# Patient Record
Sex: MALE | Race: WHITE | NOT HISPANIC OR LATINO | Employment: OTHER | URBAN - METROPOLITAN AREA
[De-identification: names, ages, dates, MRNs, and addresses within clinical notes are randomized per-mention and may not be internally consistent; named-entity substitution may affect disease eponyms.]

---

## 2023-09-13 ENCOUNTER — HOSPITAL ENCOUNTER (OUTPATIENT)
Dept: DATA CONVERSION | Facility: HOSPITAL | Age: 83
Discharge: HOME | End: 2023-09-13
Payer: MEDICARE

## 2023-09-13 DIAGNOSIS — R73.01 IMPAIRED FASTING GLUCOSE: ICD-10-CM

## 2023-09-13 LAB
ANION GAP SERPL CALCULATED.3IONS-SCNC: 10 MMOL/L (ref 0–19)
BUN SERPL-MCNC: 27 MG/DL (ref 8–25)
BUN/CREAT SERPL: 24.5 RATIO (ref 8–21)
CALCIUM SERPL-MCNC: 9.3 MG/DL (ref 8.5–10.4)
CHLORIDE SERPL-SCNC: 106 MMOL/L (ref 97–107)
CO2 SERPL-SCNC: 26 MMOL/L (ref 24–31)
CREAT SERPL-MCNC: 1.1 MG/DL (ref 0.4–1.6)
GFR SERPL CREATININE-BSD FRML MDRD: 66 ML/MIN/1.73 M2
GLUCOSE SERPL-MCNC: 102 MG/DL (ref 65–99)
POTASSIUM SERPL-SCNC: 4.6 MMOL/L (ref 3.4–5.1)
SODIUM SERPL-SCNC: 141 MMOL/L (ref 133–145)

## 2023-12-28 ENCOUNTER — TELEMEDICINE (OUTPATIENT)
Dept: PRIMARY CARE | Facility: CLINIC | Age: 83
End: 2023-12-28
Payer: MEDICARE

## 2023-12-28 VITALS — HEIGHT: 69 IN | WEIGHT: 180 LBS | BODY MASS INDEX: 26.66 KG/M2

## 2023-12-28 DIAGNOSIS — U07.1 COVID-19 VIRUS INFECTION: Primary | ICD-10-CM

## 2023-12-28 PROCEDURE — 99213 OFFICE O/P EST LOW 20 MIN: CPT | Performed by: STUDENT IN AN ORGANIZED HEALTH CARE EDUCATION/TRAINING PROGRAM

## 2023-12-28 RX ORDER — ATORVASTATIN CALCIUM 10 MG/1
10 TABLET, FILM COATED ORAL EVERY 24 HOURS
COMMUNITY

## 2023-12-28 RX ORDER — DEXTROMETHORPHAN HYDROBROMIDE, GUAIFENESIN 5; 100 MG/5ML; MG/5ML
650 LIQUID ORAL EVERY 8 HOURS PRN
COMMUNITY

## 2023-12-28 RX ORDER — NAPROXEN SODIUM 220 MG
220 TABLET ORAL EVERY 24 HOURS
COMMUNITY

## 2023-12-28 RX ORDER — TAMSULOSIN HYDROCHLORIDE 0.4 MG/1
0.4 CAPSULE ORAL DAILY
COMMUNITY

## 2023-12-28 RX ORDER — FINASTERIDE 5 MG/1
5 TABLET, FILM COATED ORAL DAILY
COMMUNITY

## 2023-12-28 ASSESSMENT — PATIENT HEALTH QUESTIONNAIRE - PHQ9
SUM OF ALL RESPONSES TO PHQ9 QUESTIONS 1 AND 2: 0
2. FEELING DOWN, DEPRESSED OR HOPELESS: NOT AT ALL
1. LITTLE INTEREST OR PLEASURE IN DOING THINGS: NOT AT ALL

## 2023-12-28 ASSESSMENT — PAIN SCALES - GENERAL: PAINLEVEL: 0-NO PAIN

## 2023-12-28 NOTE — PROGRESS NOTES
"Subjective   Patient ID: Jaden Richards is a 83 y.o. male who presents virtually for [phone] and COVID Positive.    HPI  82 yo male presents virtually for covid infection  Covid infection  Symptoms started yesterday, tested positive today  Stomach ache, headache  Rhinorrhea    Review of Systems  All pertinent positive symptoms are included in the history of present illness.    All other systems have been reviewed and are negative and noncontributory to this patient's current ailments.     No Known Allergies     Immunization History   Administered Date(s) Administered    Moderna SARS-CoV-2 Vaccination 10/24/2021, 06/11/2022       Objective   Vitals:    12/28/23 1044   Weight: 81.6 kg (180 lb)   Height: 1.753 m (5' 9\")       Physical Exam Virtual visit  CONSTITUTIONAL -in no acute distress  CHEST - non labored breathing  PSYCHIATRIC - alert, pleasant and cordial, age-appropriate    Assessment/Plan   82 yo male presents virtually for covid infection  Covid infection  Start molnupiravir, advil/tylenol prn, flonase    RTC as needed  "

## 2024-01-19 ENCOUNTER — TELEPHONE (OUTPATIENT)
Dept: PRIMARY CARE | Facility: CLINIC | Age: 84
End: 2024-01-19
Payer: MEDICARE

## 2024-01-19 NOTE — TELEPHONE ENCOUNTER
Spoke with the patient, he gave verbal consent for the office to have the records that they are asking for. He is currently in [place] until summer.

## 2024-01-19 NOTE — TELEPHONE ENCOUNTER
Received suspicious paper work from [facility] physician [facility] in [place] asking for Pt recent office note.No release from the Pt.called Pt to verify.left voice message.

## 2024-01-19 NOTE — TELEPHONE ENCOUNTER
Patient left message stating he was seeing a physician in [place] and he gave Dr Penny name to send a form to.

## 2024-01-23 NOTE — TELEPHONE ENCOUNTER
Spoke with the Pt I informed him that we would need a written medical release for records not a verbal.Pt verbally understands.Pt states he printed his records from my chart and gave it to the doctors in [place].

## 2024-07-19 DIAGNOSIS — M19.90 ARTHRITIS: Primary | ICD-10-CM

## 2024-07-19 RX ORDER — MELOXICAM 15 MG/1
15 TABLET ORAL DAILY
COMMUNITY
Start: 2024-04-27 | End: 2024-07-19 | Stop reason: SDUPTHER

## 2024-07-19 RX ORDER — MELOXICAM 15 MG/1
15 TABLET ORAL DAILY
Qty: 30 TABLET | Refills: 0 | Status: SHIPPED | OUTPATIENT
Start: 2024-07-19

## 2024-08-20 DIAGNOSIS — M19.90 ARTHRITIS: ICD-10-CM

## 2024-08-21 DIAGNOSIS — M19.90 ARTHRITIS: ICD-10-CM

## 2024-08-21 RX ORDER — MELOXICAM 15 MG/1
15 TABLET ORAL DAILY
Qty: 90 TABLET | Refills: 3 | Status: SHIPPED | OUTPATIENT
Start: 2024-08-21 | End: 2025-08-21

## 2024-08-21 RX ORDER — MELOXICAM 15 MG/1
15 TABLET ORAL DAILY
Qty: 30 TABLET | Refills: 3 | Status: SHIPPED | OUTPATIENT
Start: 2024-08-21 | End: 2024-08-21 | Stop reason: SDUPTHER

## 2024-10-10 ENCOUNTER — LAB (OUTPATIENT)
Dept: LAB | Facility: LAB | Age: 84
End: 2024-10-10
Payer: MEDICARE

## 2024-10-10 ENCOUNTER — OFFICE VISIT (OUTPATIENT)
Dept: PRIMARY CARE | Facility: CLINIC | Age: 84
End: 2024-10-10
Payer: MEDICARE

## 2024-10-10 VITALS
WEIGHT: 187 LBS | DIASTOLIC BLOOD PRESSURE: 70 MMHG | HEIGHT: 69 IN | BODY MASS INDEX: 27.7 KG/M2 | OXYGEN SATURATION: 98 % | HEART RATE: 77 BPM | SYSTOLIC BLOOD PRESSURE: 132 MMHG

## 2024-10-10 DIAGNOSIS — N40.0 BENIGN PROSTATIC HYPERPLASIA, UNSPECIFIED WHETHER LOWER URINARY TRACT SYMPTOMS PRESENT: ICD-10-CM

## 2024-10-10 DIAGNOSIS — Z23 NEED FOR VACCINATION: ICD-10-CM

## 2024-10-10 DIAGNOSIS — Z12.5 SCREENING PSA (PROSTATE SPECIFIC ANTIGEN): ICD-10-CM

## 2024-10-10 DIAGNOSIS — Z00.00 ROUTINE GENERAL MEDICAL EXAMINATION AT HEALTH CARE FACILITY: Primary | ICD-10-CM

## 2024-10-10 PROBLEM — C44.311 BASAL CELL CARCINOMA OF SKIN OF NOSE: Status: ACTIVE | Noted: 2019-06-04

## 2024-10-10 PROBLEM — M17.12 ARTHRITIS OF LEFT KNEE: Status: ACTIVE | Noted: 2024-02-14

## 2024-10-10 LAB
ANION GAP SERPL CALCULATED.3IONS-SCNC: 10 MMOL/L (ref 10–20)
BUN SERPL-MCNC: 27 MG/DL (ref 6–23)
CALCIUM SERPL-MCNC: 9.3 MG/DL (ref 8.6–10.3)
CHLORIDE SERPL-SCNC: 107 MMOL/L (ref 98–107)
CO2 SERPL-SCNC: 29 MMOL/L (ref 21–32)
CREAT SERPL-MCNC: 1.12 MG/DL (ref 0.5–1.3)
EGFRCR SERPLBLD CKD-EPI 2021: 64 ML/MIN/1.73M*2
GLUCOSE SERPL-MCNC: 108 MG/DL (ref 74–99)
POC APPEARANCE, URINE: CLEAR
POC BILIRUBIN, URINE: NEGATIVE
POC BLOOD, URINE: NEGATIVE
POC COLOR, URINE: YELLOW
POC GLUCOSE, URINE: NEGATIVE MG/DL
POC KETONES, URINE: NEGATIVE MG/DL
POC LEUKOCYTES, URINE: NEGATIVE
POC NITRITE,URINE: NEGATIVE
POC PH, URINE: 6 PH
POC PROTEIN, URINE: NEGATIVE MG/DL
POC SPECIFIC GRAVITY, URINE: 1.02
POC UROBILINOGEN, URINE: 0.2 EU/DL
POTASSIUM SERPL-SCNC: 4.7 MMOL/L (ref 3.5–5.3)
PSA SERPL-MCNC: 7.8 NG/ML
SODIUM SERPL-SCNC: 141 MMOL/L (ref 136–145)

## 2024-10-10 PROCEDURE — 1036F TOBACCO NON-USER: CPT | Performed by: FAMILY MEDICINE

## 2024-10-10 PROCEDURE — G0103 PSA SCREENING: HCPCS

## 2024-10-10 PROCEDURE — 90662 IIV NO PRSV INCREASED AG IM: CPT | Performed by: FAMILY MEDICINE

## 2024-10-10 PROCEDURE — G0439 PPPS, SUBSEQ VISIT: HCPCS | Performed by: FAMILY MEDICINE

## 2024-10-10 PROCEDURE — 80048 BASIC METABOLIC PNL TOTAL CA: CPT

## 2024-10-10 PROCEDURE — 1158F ADVNC CARE PLAN TLK DOCD: CPT | Performed by: FAMILY MEDICINE

## 2024-10-10 PROCEDURE — 36415 COLL VENOUS BLD VENIPUNCTURE: CPT

## 2024-10-10 PROCEDURE — 1123F ACP DISCUSS/DSCN MKR DOCD: CPT | Performed by: FAMILY MEDICINE

## 2024-10-10 PROCEDURE — 1126F AMNT PAIN NOTED NONE PRSNT: CPT | Performed by: FAMILY MEDICINE

## 2024-10-10 PROCEDURE — 1159F MED LIST DOCD IN RCRD: CPT | Performed by: FAMILY MEDICINE

## 2024-10-10 PROCEDURE — 81003 URINALYSIS AUTO W/O SCOPE: CPT | Mod: QW | Performed by: FAMILY MEDICINE

## 2024-10-10 PROCEDURE — 99215 OFFICE O/P EST HI 40 MIN: CPT | Performed by: FAMILY MEDICINE

## 2024-10-10 ASSESSMENT — PATIENT HEALTH QUESTIONNAIRE - PHQ9
2. FEELING DOWN, DEPRESSED OR HOPELESS: NOT AT ALL
SUM OF ALL RESPONSES TO PHQ9 QUESTIONS 1 AND 2: 0
1. LITTLE INTEREST OR PLEASURE IN DOING THINGS: NOT AT ALL

## 2024-10-10 ASSESSMENT — ENCOUNTER SYMPTOMS
HEADACHES: 0
ARTHRALGIAS: 1
ACTIVITY CHANGE: 0
PALPITATIONS: 0
DIFFICULTY URINATING: 0
JOINT SWELLING: 1
DIZZINESS: 0
BACK PAIN: 0

## 2024-10-10 ASSESSMENT — VISUAL ACUITY
OD_CC: 20/30
OS_CC: 20/30

## 2024-10-10 ASSESSMENT — COGNITIVE AND FUNCTIONAL STATUS - GENERAL: VERBAL FLUENCY - ANIMAL NAMES (0 TO 25): 3

## 2024-10-10 ASSESSMENT — PAIN SCALES - GENERAL: PAINLEVEL: 0-NO PAIN

## 2024-10-10 NOTE — PATIENT INSTRUCTIONS
Recommend metamucil for constipation or looser stools.  Can see GI if needed,   Covid and RSV vaccines recommended.   Follow up urology and ophthalmology.

## 2024-10-10 NOTE — PROGRESS NOTES
[facility]: [facility]  MEDICARE WELLNESS EXAM      Jaden Richards is a 83 y.o. male that is presenting today for Annual Exam.    Concerns:    Subjective   Pt states when he was in [place] he had knee swelling had injection by orthopedics Jan 29. Knee is still doing well.  Takes meloxicam for oa     Dyslipidemia- he stopped taking atorvostatin.      Golf for exercise.      Had reaction to azithromycin this past year.       Review of Systems   Constitutional:  Negative for activity change.   Cardiovascular:  Negative for chest pain, palpitations and leg swelling.   Genitourinary:  Negative for difficulty urinating.   Musculoskeletal:  Positive for arthralgias and joint swelling. Negative for back pain and gait problem.   Neurological:  Negative for dizziness and headaches.       Other Providers: [facility].  [facility]. Dermatology.     Hearing Changes: no    Cognitive Assessment: mini-cog    Depression Screening: negative     ACTIVITIES OF DAILY LIVING:  Basic ADLs:  Problems with Bathing, Dressing, Toileting, Transferring, Continence, Feeding No  Instrumental ADLs:  No problems with Ability to use phone, Shopping, Cooking, House-keeping, Laundry, Transportation, Medication Management, Finance Management No    Advanced Care Planning was discussed with patient:  The patient does not have an advanced care plan on file. The patient does not have an active surrogate decision-maker on file.    History    Past Medical History:   Diagnosis Date    Allergic 3/3/2023    Arthritis 2/14/2024    Other specified health status     Known health problems: none     Past Surgical History:   Procedure Laterality Date    CHOLECYSTECTOMY  12/15/1991    CIRCUMCISION, PRIMARY  1938    EYE SURGERY  8/14/2014    HERNIA REPAIR  2/15/1975    OTHER SURGICAL HISTORY  12/06/2022    No history of surgery    TONSILLECTOMY  2/15/1958    VASECTOMY  [id number]     Family History   Problem Relation Name  Age of Onset    Arthritis Father Kevin     Accidental death Brother Santiago     Cancer Brother Ben     Cancer Sister Daniela      Allergies   Allergen Reactions    Amoxicillin Nausea Only    Azithromycin Other     Current Outpatient Medications on File Prior to Visit   Medication Sig Dispense Refill    acetaminophen (Tylenol 8 HOUR) 650 mg ER tablet Take 1 tablet (650 mg) by mouth every 8 hours if needed for mild pain (1 - 3). Do not crush, chew, or split.      finasteride (Proscar) 5 mg tablet Take 1 tablet (5 mg) by mouth once daily.      meloxicam (Mobic) 15 mg tablet Take 1 tablet (15 mg) by mouth once daily. 90 tablet 3    molnupiravir 200 mg capsule Take 4 capsules (800 mg) by mouth 2 times a day. 40 capsule 0    naproxen sodium (Aleve) 220 mg tablet Take 1 tablet (220 mg) by mouth once every 24 hours.      tamsulosin (Flomax) 0.4 mg 24 hr capsule Take 1 capsule (0.4 mg) by mouth once daily.      atorvastatin (Lipitor) 10 mg tablet Take 1 tablet (10 mg) by mouth once every 24 hours.       No current facility-administered medications on file prior to visit.     Immunization History   Administered Date(s) Administered    Moderna COVID-19 vaccine, 12 years and older (50mcg/0.5mL)(Spikevax) 10/04/2023, 10/02/2024    Moderna SARS-CoV-2 Vaccination 01/30/2021, 02/27/2021, 10/24/2021, 06/11/2022     Patient's medical history was reviewed and updated either before or during this encounter.    Objective   Vitals:    10/10/24 0754   BP: 132/70   Pulse: 77   SpO2: 98%      Physical Exam  Constitutional:       General: He is not in acute distress.     Appearance: Normal appearance. He is not ill-appearing.   HENT:      Right Ear: Tympanic membrane, ear canal and external ear normal. There is no impacted cerumen.      Left Ear: Tympanic membrane, ear canal and external ear normal.      Mouth/Throat:      Pharynx: Oropharynx is clear. No posterior oropharyngeal erythema.   Neck:      Thyroid: No thyroid mass or thyroid  tenderness.      Vascular: No carotid bruit.   Cardiovascular:      Rate and Rhythm: Normal rate and regular rhythm.      Pulses:           Radial pulses are 2+ on the right side and 2+ on the left side.        Dorsalis pedis pulses are 2+ on the right side and 2+ on the left side.      Heart sounds: Normal heart sounds. No murmur heard.     No friction rub. No gallop.   Pulmonary:      Effort: Pulmonary effort is normal.      Breath sounds: Normal breath sounds.   Abdominal:      General: Bowel sounds are normal.      Palpations: Abdomen is soft. There is no hepatomegaly or splenomegaly.      Tenderness: There is no abdominal tenderness.   Musculoskeletal:      Cervical back: Normal range of motion. No tenderness.      Right lower leg: No edema.      Left lower leg: No edema.      Comments: No gross abnormalities    Lymphadenopathy:      Cervical: No cervical adenopathy.      Upper Body:      Right upper body: No supraclavicular adenopathy.      Left upper body: No supraclavicular adenopathy.   Skin:     General: Skin is warm.      Capillary Refill: Capillary refill takes 2 to 3 seconds.   Neurological:      General: No focal deficit present.      Mental Status: He is alert.      Cranial Nerves: Cranial nerves 2-12 are intact.      Coordination: Coordination is intact.      Gait: Gait is intact.      Comments: No obvious neurological deficits    Psychiatric:         Mood and Affect: Mood and affect normal.       Mobility Assessment: get up and go test <30 seconds- Yes.       Assessment/Plan    There are no diagnoses linked to this encounter.  There is no problem list on file for this patient.    Advance Care Planning   Diagnoses and all orders for this visit:  Routine general medical examination at health care facility  -     POCT UA Automated manually resulted  -     1 Year Follow Up In Primary Care - Wellness Exam; Future  Need for vaccination  Other orders  -     Flu vaccine, trivalent, preservative free,  HIGH-DOSE, age 65y+ (Fluzone)  Recommend metamucil for constipation or looser stools.  Can see GI if needed,   Covid and RSV vaccines recommended.   The patient was encouraged to ensure that any/all documentation is accurate and up to date, and that our office be provided a copy in the event that anything changes.    Santiago Knowles MD

## 2025-07-10 ENCOUNTER — OFFICE VISIT (OUTPATIENT)
Dept: PRIMARY CARE | Facility: CLINIC | Age: 85
End: 2025-07-10
Payer: MEDICARE

## 2025-07-10 VITALS
HEIGHT: 69 IN | WEIGHT: 186 LBS | OXYGEN SATURATION: 95 % | HEART RATE: 85 BPM | DIASTOLIC BLOOD PRESSURE: 78 MMHG | SYSTOLIC BLOOD PRESSURE: 132 MMHG | BODY MASS INDEX: 27.55 KG/M2

## 2025-07-10 DIAGNOSIS — M75.81 TENDINITIS OF RIGHT ROTATOR CUFF: Primary | ICD-10-CM

## 2025-07-10 PROCEDURE — 1159F MED LIST DOCD IN RCRD: CPT | Performed by: FAMILY MEDICINE

## 2025-07-10 PROCEDURE — 1123F ACP DISCUSS/DSCN MKR DOCD: CPT | Performed by: FAMILY MEDICINE

## 2025-07-10 PROCEDURE — 1036F TOBACCO NON-USER: CPT | Performed by: FAMILY MEDICINE

## 2025-07-10 PROCEDURE — 99213 OFFICE O/P EST LOW 20 MIN: CPT | Performed by: FAMILY MEDICINE

## 2025-07-10 PROCEDURE — 1125F AMNT PAIN NOTED PAIN PRSNT: CPT | Performed by: FAMILY MEDICINE

## 2025-07-10 PROCEDURE — G2211 COMPLEX E/M VISIT ADD ON: HCPCS | Performed by: FAMILY MEDICINE

## 2025-07-10 RX ORDER — CELECOXIB 200 MG/1
200 CAPSULE ORAL 2 TIMES DAILY
Qty: 60 CAPSULE | Refills: 5 | Status: SHIPPED | OUTPATIENT
Start: 2025-07-10 | End: 2026-01-06

## 2025-07-10 RX ORDER — TIZANIDINE HYDROCHLORIDE 4 MG/1
4 CAPSULE, GELATIN COATED ORAL NIGHTLY
Qty: 30 CAPSULE | Refills: 0 | Status: SHIPPED | OUTPATIENT
Start: 2025-07-10

## 2025-07-10 ASSESSMENT — COLUMBIA-SUICIDE SEVERITY RATING SCALE - C-SSRS
6. HAVE YOU EVER DONE ANYTHING, STARTED TO DO ANYTHING, OR PREPARED TO DO ANYTHING TO END YOUR LIFE?: NO
2. HAVE YOU ACTUALLY HAD ANY THOUGHTS OF KILLING YOURSELF?: NO
1. IN THE PAST MONTH, HAVE YOU WISHED YOU WERE DEAD OR WISHED YOU COULD GO TO SLEEP AND NOT WAKE UP?: NO

## 2025-07-10 ASSESSMENT — PATIENT HEALTH QUESTIONNAIRE - PHQ9
SUM OF ALL RESPONSES TO PHQ9 QUESTIONS 1 AND 2: 0
1. LITTLE INTEREST OR PLEASURE IN DOING THINGS: NOT AT ALL
2. FEELING DOWN, DEPRESSED OR HOPELESS: NOT AT ALL

## 2025-07-10 ASSESSMENT — ENCOUNTER SYMPTOMS
LOSS OF SENSATION IN FEET: 0
DEPRESSION: 0
OCCASIONAL FEELINGS OF UNSTEADINESS: 0

## 2025-07-10 ASSESSMENT — PAIN SCALES - GENERAL: PAINLEVEL_OUTOF10: 7

## 2025-07-10 NOTE — PROGRESS NOTES
84-year-old presents to establish care and for new complaints      Right shoulder pain:  Has had complete workup and attempted trial treatment with orthopedic surgery already including MRI which did show impingement syndrome, rotator cuff tinnitus, partial tearing of the infraspinatus tendon, and arthritis of the glenohumeral joint.  Has trialed cortisone injection and recently received a PRP injection about 3 months ago but is not noticed any substantial relief.  Range of motion is good but he does experience substantial pain on the lateral aspect of the shoulder with chronic repetitive activity and pain worsens at nighttime.  Currently on meloxicam and only notices partial relief has trialed naproxen as well without beneficial relief    All pertinent positive symptoms are included in history of present illness.    All other systems have been reviewed and are negative and noncontributory to this patient's current ailments.      CONSTITUTIONAL - INAD. Not ill appearing.  SKIN - No lesions or rashes visualized. No jaundice visualized.  HEENT- Atraumatic, normocephalic, no scleral icterus, external nares are not erythematous and without drainage, no neck masses visualized, oropharynx visualized and is without erythema or exudate  RESP - respiration not labored   CARDIAC - no grade 6 systolic murmurs auscultated  ABDOMEN - nondistended.  NEURO- CNs II-XII grossly intact  MUSCULOSKELETAL  Affected shoulder range of motion is  160° abduction, 160° flexion, external rotation to 70°, internal rotation to inferior border of the scapula .  Positive Schaffer, negative Neer's, negative Jobs, negative biceps insertion tenderness, negative AC joint tenderness.    1. Tendinitis of right rotator cuff (Primary)  Shoulder exam is fairly intact today good range of motion, only mildly positive Schaffer.  Will discontinue meloxicam and trial Celebrex and decide which 1 seems to work better.  Will trial Zanaflex at nighttime.  Continue  follow-up with orthopedic surgery.  As he has not attempted physical therapy yet I think it would be beneficial to at least attempt physical therapy before pursuing more intensive interventions.    - celecoxib (CeleBREX) 200 mg capsule; Take 1 capsule (200 mg) by mouth 2 times a day.  Dispense: 60 capsule; Refill: 5  - Referral to Physical Therapy; Future  - tiZANidine (Zanaflex) 4 mg capsule; Take 1 capsule (4 mg) by mouth once daily at bedtime.  Dispense: 30 capsule; Refill: 0

## 2025-07-28 ENCOUNTER — EVALUATION (OUTPATIENT)
Dept: PHYSICAL THERAPY | Facility: CLINIC | Age: 85
End: 2025-07-28
Payer: MEDICARE

## 2025-07-28 DIAGNOSIS — M25.511 RIGHT SHOULDER PAIN: Primary | ICD-10-CM

## 2025-07-28 PROCEDURE — 97110 THERAPEUTIC EXERCISES: CPT | Mod: GP

## 2025-07-28 PROCEDURE — 97161 PT EVAL LOW COMPLEX 20 MIN: CPT | Mod: GP

## 2025-07-28 NOTE — PROGRESS NOTES
Physical Therapy    Physical Therapy Evaluation and Treatment    Patient Name: Jaden Richards  MRN: [id number]  Encounter Date: 7/28/2025          Current Problem:   1. Right shoulder pain          Precautions: none  STEADI Fall Risk: 2 (score of 4+ indicates fall risk)       SUBJECTIVE:   The patient presents with chronic right shoulder pain that has persisted despite a comprehensive orthopedic workup and multiple treatment trials. Patient reports:    Pain location: Lateral aspect of the right shoulder    Duration: Chronic; symptoms ongoing    Aggravating factors: Repetitive overhead activity, lifting, nighttime positioning    Current pain level: 3/10 pain at rest     Previous interventions:  MRI: Showed impingement syndrome, partial infraspinatus tear, rotator cuff tendinosis, and glenohumeral arthritis ( PER MD's note)    Limited relief with meloxicam; naproxen ineffective; PRP injection 3 months ago provided no significant improvement    Pain:   Current: 2/10  Outcome Measures: SPADI 26/130       OBJECTIVE:    Posture:  - Forward shoulder posture noted  - Mild scapular winging on the right with static posture    Range of Motion (Right Shoulder):  - Flexion: WNL with end-range discomfort  - Abduction: WNL with lateral shoulder pain  - ER @ 90° ABD: Mild restriction with discomfort  - IR @ 90° ABD: WNL    Strength Testing (R Shoulder - Manual Muscle Testing):  - Flexion: 4/5 with pain  - Abduction: 4-/5 with pain  - ER: 4-/5 with discomfort  - IR: 4+/5  - Scapular retractors: 4/5    Special Tests:  - Neer Impingement Test: Positive  - Schaffer-Tarun: Positive  - Drop Arm Test: Negative  - Empty Can Test: Painful but strong  - Cross-body adduction: Painful  - Speeds/Yergason’s: Negative    Palpation:  - Tenderness over lateral deltoid and subacromial space    Treatments:  Therapeutic Exercise: (10 minutes)   Access Code: [email or web address]  URL: [email or web address]  Date: 07/28/2025  Prepared by: Katie  Vukancic    Exercises  - Scapular Retraction with Resistance  - 1 x daily - 7 x weekly - 3 sets - 10 reps  - Scapular Retraction with Resistance Advanced  - 1 x daily - 7 x weekly - 3 sets - 10 reps  - Shoulder External Rotation Reactive Isometrics  - 1 x daily - 7 x weekly - 3 sets - 10 reps  - Shoulder Internal Rotation Reactive Isometrics  - 1 x daily - 7 x weekly - 3 sets - 10 reps    OP Education: see above     HEP:  See above    Response to treatment: good    ASSESSMENT:   The patient presents with chronic right shoulder pain consistent with impingement syndrome and partial rotator cuff involvement, compounded by degenerative changes in the glenohumeral joint. Pt  would benefit from formal physical therapy but like to try exercises independently at home for a month, if symptoms do not improve patient would like to come back for a structured in person PT. Primary impairments include scapular dyskinesis, rotator cuff weakness, and pain with overhead/repetitive use. Functional limitations include reaching, lifting, and sleeping on the affected side.    Complexity of Evaluation: low  Based on the history including personal factors and/or comorbidities, examination of body systems including body structures and function, activity limitations, and/or participation restrictions, as well as clinical presentation, patient meets criteria for a low complexity evaluation.     PLAN:  Pt would like to try home exercises for about a month, if symptoms do not resolve he would like to come back. Hold for 1 month, DC PT after 08/28    Goals:   Pt will be indep with HEP

## 2025-08-06 ENCOUNTER — TELEPHONE (OUTPATIENT)
Dept: PRIMARY CARE | Facility: CLINIC | Age: 85
End: 2025-08-06
Payer: MEDICARE

## 2025-08-06 NOTE — TELEPHONE ENCOUNTER
Pt called wanting to discontinue the tizanidine 4 mg. Pt stated he is getting cramps and muscle pain from it. Please advise.

## 2025-08-22 ENCOUNTER — TELEPHONE (OUTPATIENT)
Dept: PRIMARY CARE | Facility: CLINIC | Age: 85
End: 2025-08-22
Payer: MEDICARE

## 2025-10-16 ENCOUNTER — APPOINTMENT (OUTPATIENT)
Dept: PRIMARY CARE | Facility: CLINIC | Age: 85
End: 2025-10-16
Payer: MEDICARE